# Patient Record
Sex: MALE | Race: WHITE | NOT HISPANIC OR LATINO | URBAN - NONMETROPOLITAN AREA
[De-identification: names, ages, dates, MRNs, and addresses within clinical notes are randomized per-mention and may not be internally consistent; named-entity substitution may affect disease eponyms.]

---

## 2023-07-07 ENCOUNTER — OFFICE VISIT (OUTPATIENT)
Dept: PRIMARY CARE | Facility: CLINIC | Age: 48
End: 2023-07-07
Payer: COMMERCIAL

## 2023-07-07 VITALS
HEIGHT: 74 IN | SYSTOLIC BLOOD PRESSURE: 130 MMHG | HEART RATE: 56 BPM | DIASTOLIC BLOOD PRESSURE: 88 MMHG | OXYGEN SATURATION: 96 % | WEIGHT: 222.25 LBS | BODY MASS INDEX: 28.52 KG/M2

## 2023-07-07 DIAGNOSIS — Z00.00 ROUTINE GENERAL MEDICAL EXAMINATION AT A HEALTH CARE FACILITY: Primary | ICD-10-CM

## 2023-07-07 DIAGNOSIS — Z02.4 ENCOUNTER FOR DEPARTMENT OF TRANSPORTATION (DOT) EXAMINATION FOR DRIVING LICENSE RENEWAL: ICD-10-CM

## 2023-07-07 PROBLEM — I10 BENIGN ESSENTIAL HYPERTENSION: Status: ACTIVE | Noted: 2023-07-07

## 2023-07-07 LAB
POC APPEARANCE, URINE: CLEAR
POC BILIRUBIN, URINE: NEGATIVE
POC BLOOD, URINE: NEGATIVE
POC COLOR, URINE: YELLOW
POC GLUCOSE, URINE: NEGATIVE MG/DL
POC KETONES, URINE: NEGATIVE MG/DL
POC LEUKOCYTES, URINE: NEGATIVE
POC NITRITE,URINE: NEGATIVE
POC PH, URINE: 7 PH
POC PROTEIN, URINE: NEGATIVE MG/DL
POC SPECIFIC GRAVITY, URINE: 1.02
POC UROBILINOGEN, URINE: 0.2 EU/DL

## 2023-07-07 PROCEDURE — 99396 PREV VISIT EST AGE 40-64: CPT | Performed by: FAMILY MEDICINE

## 2023-07-07 PROCEDURE — 81002 URINALYSIS NONAUTO W/O SCOPE: CPT | Performed by: FAMILY MEDICINE

## 2023-07-07 PROCEDURE — 3079F DIAST BP 80-89 MM HG: CPT | Performed by: FAMILY MEDICINE

## 2023-07-07 PROCEDURE — 3075F SYST BP GE 130 - 139MM HG: CPT | Performed by: FAMILY MEDICINE

## 2023-07-07 RX ORDER — LOSARTAN POTASSIUM 100 MG/1
100 TABLET ORAL DAILY
COMMUNITY
End: 2023-09-25

## 2023-07-07 NOTE — PROGRESS NOTES
"Subjective   Patient ID: Lester Rodriges is a 48 y.o. male who presents for Annual Exam (ODOT Physical ).  Patient here for checkup and DOT physical  - Patient has a history of hypertension on recheck his blood pressure was better controlled  - Patient has been taking his medicine as prescribed  - Patient denies any chest pain, shortness of breath, palpitations, syncope or near syncope.  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   HENT:  Negative for congestion and trouble swallowing.    Eyes:  Negative for visual disturbance.   Respiratory:  Negative for shortness of breath.    Cardiovascular:  Negative for chest pain, palpitations and leg swelling.   Gastrointestinal:  Negative for blood in stool, constipation and diarrhea.   Genitourinary:  Negative for difficulty urinating and hematuria.   Musculoskeletal:  Negative for gait problem and joint swelling.   Skin:  Negative for color change.   Allergic/Immunologic: Negative for immunocompromised state.   Neurological:  Negative for seizures and syncope.   Psychiatric/Behavioral:  Negative for confusion and suicidal ideas. The patient is not nervous/anxious.        Objective   BP (!) 145/91   Pulse 56   Ht 1.867 m (6' 1.5\")   Wt 101 kg (222 lb 4 oz)   SpO2 96%   BMI 28.92 kg/m²     Physical Exam  Constitutional:       General: He is not in acute distress.     Appearance: Normal appearance. He is not ill-appearing.   HENT:      Head: Normocephalic and atraumatic.      Right Ear: Tympanic membrane normal.      Left Ear: Tympanic membrane normal.      Nose: Nose normal.      Mouth/Throat:      Mouth: Mucous membranes are moist.   Eyes:      Pupils: Pupils are equal, round, and reactive to light.   Cardiovascular:      Rate and Rhythm: Normal rate and regular rhythm.      Heart sounds: No murmur heard.     No friction rub. No gallop. "   Pulmonary:      Effort: Pulmonary effort is normal.      Breath sounds: Normal breath sounds.   Abdominal:      General: Abdomen is flat. There is no distension.      Palpations: Abdomen is soft.      Tenderness: There is no abdominal tenderness. There is no guarding.      Hernia: No hernia is present.   Musculoskeletal:         General: Normal range of motion.   Skin:     General: Skin is warm and dry.   Neurological:      General: No focal deficit present.      Mental Status: He is alert and oriented to person, place, and time. Mental status is at baseline.      Cranial Nerves: No cranial nerve deficit.      Sensory: No sensory deficit.      Motor: No weakness.      Coordination: Coordination normal.      Gait: Gait normal.   Psychiatric:         Mood and Affect: Mood normal.         Behavior: Behavior normal.         Thought Content: Thought content normal.         Judgment: Judgment normal.         Assessment/Plan   Problem List Items Addressed This Visit    None  Visit Diagnoses       Encounter for Department of Transportation (DOT) examination for driving license renewal        Relevant Orders    POCT UA (nonautomated) manually resulted (Completed)          A/P:  #DOT PE: cleared for 1y cert ending 7/7/24    #HTN:  -losartan 100mg        HM:  -cologuard: 7/25  -suggest blood work

## 2023-07-10 ASSESSMENT — ENCOUNTER SYMPTOMS
FEVER: 0
CONSTIPATION: 0
HEMATURIA: 0
TROUBLE SWALLOWING: 0
CONFUSION: 0
NERVOUS/ANXIOUS: 0
COLOR CHANGE: 0
SEIZURES: 0
APPETITE CHANGE: 0
DIFFICULTY URINATING: 0
PALPITATIONS: 0
SHORTNESS OF BREATH: 0
UNEXPECTED WEIGHT CHANGE: 0
DIARRHEA: 0
JOINT SWELLING: 0
BLOOD IN STOOL: 0

## 2023-07-10 ASSESSMENT — PATIENT HEALTH QUESTIONNAIRE - PHQ9
2. FEELING DOWN, DEPRESSED OR HOPELESS: NOT AT ALL
1. LITTLE INTEREST OR PLEASURE IN DOING THINGS: NOT AT ALL
SUM OF ALL RESPONSES TO PHQ9 QUESTIONS 1 AND 2: 0

## 2023-09-25 DIAGNOSIS — I10 ESSENTIAL (PRIMARY) HYPERTENSION: ICD-10-CM

## 2023-09-25 RX ORDER — LOSARTAN POTASSIUM 100 MG/1
100 TABLET ORAL DAILY
Qty: 90 TABLET | Refills: 3 | Status: SHIPPED | OUTPATIENT
Start: 2023-09-25

## 2024-07-18 ENCOUNTER — TELEPHONE (OUTPATIENT)
Dept: PRIMARY CARE | Facility: CLINIC | Age: 49
End: 2024-07-18
Payer: COMMERCIAL

## 2024-07-18 DIAGNOSIS — I10 ESSENTIAL (PRIMARY) HYPERTENSION: ICD-10-CM

## 2024-07-18 RX ORDER — LOSARTAN POTASSIUM 100 MG/1
100 TABLET ORAL DAILY
Qty: 90 TABLET | Refills: 0 | Status: SHIPPED | OUTPATIENT
Start: 2024-07-18

## 2024-10-24 ENCOUNTER — APPOINTMENT (OUTPATIENT)
Dept: PRIMARY CARE | Facility: CLINIC | Age: 49
End: 2024-10-24
Payer: COMMERCIAL

## 2024-10-24 VITALS
DIASTOLIC BLOOD PRESSURE: 92 MMHG | OXYGEN SATURATION: 97 % | WEIGHT: 230 LBS | HEIGHT: 73 IN | HEART RATE: 82 BPM | BODY MASS INDEX: 30.48 KG/M2 | SYSTOLIC BLOOD PRESSURE: 142 MMHG

## 2024-10-24 DIAGNOSIS — Z13.1 DIABETES MELLITUS SCREENING: ICD-10-CM

## 2024-10-24 DIAGNOSIS — Z13.220 LIPID SCREENING: ICD-10-CM

## 2024-10-24 DIAGNOSIS — Z00.00 ROUTINE GENERAL MEDICAL EXAMINATION AT A HEALTH CARE FACILITY: Primary | ICD-10-CM

## 2024-10-24 DIAGNOSIS — I10 ESSENTIAL (PRIMARY) HYPERTENSION: ICD-10-CM

## 2024-10-24 DIAGNOSIS — Z12.5 PROSTATE CANCER SCREENING: ICD-10-CM

## 2024-10-24 DIAGNOSIS — Z13.0 SCREENING FOR DEFICIENCY ANEMIA: ICD-10-CM

## 2024-10-24 LAB
ALBUMIN SERPL BCP-MCNC: 4.2 G/DL (ref 3.4–5)
ALP SERPL-CCNC: 102 U/L (ref 33–120)
ALT SERPL W P-5'-P-CCNC: 24 U/L (ref 10–52)
ANION GAP SERPL CALC-SCNC: 12 MMOL/L (ref 10–20)
AST SERPL W P-5'-P-CCNC: 15 U/L (ref 9–39)
BILIRUB SERPL-MCNC: 0.3 MG/DL (ref 0–1.2)
BUN SERPL-MCNC: 17 MG/DL (ref 6–23)
CALCIUM SERPL-MCNC: 9 MG/DL (ref 8.6–10.3)
CHLORIDE SERPL-SCNC: 103 MMOL/L (ref 98–107)
CHOLEST SERPL-MCNC: 174 MG/DL (ref 0–199)
CHOLESTEROL/HDL RATIO: 3.5
CO2 SERPL-SCNC: 27 MMOL/L (ref 21–32)
CREAT SERPL-MCNC: 0.91 MG/DL (ref 0.5–1.3)
EGFRCR SERPLBLD CKD-EPI 2021: >90 ML/MIN/1.73M*2
ERYTHROCYTE [DISTWIDTH] IN BLOOD BY AUTOMATED COUNT: 13 % (ref 11.5–14.5)
GLUCOSE SERPL-MCNC: 91 MG/DL (ref 74–99)
HCT VFR BLD AUTO: 45 % (ref 41–52)
HDLC SERPL-MCNC: 50 MG/DL
HGB BLD-MCNC: 15 G/DL (ref 13.5–17.5)
LDLC SERPL CALC-MCNC: 84 MG/DL
MCH RBC QN AUTO: 29.4 PG (ref 26–34)
MCHC RBC AUTO-ENTMCNC: 33.3 G/DL (ref 32–36)
MCV RBC AUTO: 88 FL (ref 80–100)
NON HDL CHOLESTEROL: 124 MG/DL (ref 0–149)
NRBC BLD-RTO: 0 /100 WBCS (ref 0–0)
PLATELET # BLD AUTO: 271 X10*3/UL (ref 150–450)
POTASSIUM SERPL-SCNC: 4.3 MMOL/L (ref 3.5–5.3)
PROT SERPL-MCNC: 6.4 G/DL (ref 6.4–8.2)
PSA SERPL-MCNC: 0.63 NG/ML
RBC # BLD AUTO: 5.1 X10*6/UL (ref 4.5–5.9)
SODIUM SERPL-SCNC: 138 MMOL/L (ref 136–145)
TRIGL SERPL-MCNC: 198 MG/DL (ref 0–149)
VLDL: 40 MG/DL (ref 0–40)
WBC # BLD AUTO: 5.7 X10*3/UL (ref 4.4–11.3)

## 2024-10-24 PROCEDURE — 85027 COMPLETE CBC AUTOMATED: CPT

## 2024-10-24 PROCEDURE — 80053 COMPREHEN METABOLIC PANEL: CPT

## 2024-10-24 PROCEDURE — 80061 LIPID PANEL: CPT

## 2024-10-24 PROCEDURE — 3080F DIAST BP >= 90 MM HG: CPT | Performed by: FAMILY MEDICINE

## 2024-10-24 PROCEDURE — 84153 ASSAY OF PSA TOTAL: CPT

## 2024-10-24 PROCEDURE — 99396 PREV VISIT EST AGE 40-64: CPT | Performed by: FAMILY MEDICINE

## 2024-10-24 PROCEDURE — 3077F SYST BP >= 140 MM HG: CPT | Performed by: FAMILY MEDICINE

## 2024-10-24 PROCEDURE — 3008F BODY MASS INDEX DOCD: CPT | Performed by: FAMILY MEDICINE

## 2024-10-24 RX ORDER — LOSARTAN POTASSIUM 100 MG/1
100 TABLET ORAL DAILY
Qty: 90 TABLET | Refills: 3 | Status: SHIPPED | OUTPATIENT
Start: 2024-10-24

## 2024-10-24 ASSESSMENT — ENCOUNTER SYMPTOMS
COLOR CHANGE: 0
UNEXPECTED WEIGHT CHANGE: 0
SHORTNESS OF BREATH: 0
BLOOD IN STOOL: 0
CONFUSION: 0
DIFFICULTY URINATING: 0
JOINT SWELLING: 0
SEIZURES: 0
DIARRHEA: 0
TROUBLE SWALLOWING: 0
NERVOUS/ANXIOUS: 0
HEMATURIA: 0
APPETITE CHANGE: 0
FEVER: 0
PALPITATIONS: 0
CONSTIPATION: 0

## 2024-10-24 NOTE — PROGRESS NOTES
"Subjective   Patient ID: Lester Rodriges is a 49 y.o. male who presents for Annual Exam (Yearly check up ).  HPI  BP high today:  -at home running max 140-usually 125-130/80's  -no sob, cp,palps    Otherwise patient has been feeling well.  Sleeping normally.  Normal voiding and stooling.  No chest pain shortness of breath palpitation.  No depression or anxiety.  He quit smoking in 2030.    Review of Systems   Constitutional:  Negative for appetite change, fever and unexpected weight change.   HENT:  Negative for congestion and trouble swallowing.    Eyes:  Negative for visual disturbance.   Respiratory:  Negative for shortness of breath.    Cardiovascular:  Negative for chest pain, palpitations and leg swelling.   Gastrointestinal:  Negative for blood in stool, constipation and diarrhea.   Genitourinary:  Negative for difficulty urinating and hematuria.   Musculoskeletal:  Negative for gait problem and joint swelling.   Skin:  Negative for color change.   Allergic/Immunologic: Negative for immunocompromised state.   Neurological:  Negative for seizures and syncope.   Psychiatric/Behavioral:  Negative for confusion and suicidal ideas. The patient is not nervous/anxious.        Objective   BP (!) 142/92   Pulse 82   Ht 1.854 m (6' 1\")   Wt 104 kg (230 lb)   SpO2 97%   BMI 30.34 kg/m²     Physical Exam  Constitutional:       General: He is not in acute distress.     Appearance: Normal appearance. He is not ill-appearing.   HENT:      Head: Normocephalic and atraumatic.      Right Ear: Tympanic membrane normal.      Left Ear: Tympanic membrane normal.      Nose: Nose normal.      Mouth/Throat:      Mouth: Mucous membranes are moist.   Eyes:      Pupils: Pupils are equal, round, and reactive to light.   Cardiovascular:      Rate and Rhythm: Normal rate and regular rhythm.      Heart sounds: No murmur heard.     No friction rub. No gallop.   Pulmonary:      Effort: Pulmonary effort is normal.      Breath sounds: Normal " breath sounds.   Abdominal:      General: Abdomen is flat. There is no distension.      Palpations: Abdomen is soft.      Tenderness: There is no abdominal tenderness. There is no guarding.      Hernia: No hernia is present.   Musculoskeletal:         General: Normal range of motion.   Skin:     General: Skin is warm and dry.   Neurological:      General: No focal deficit present.      Mental Status: He is alert. Mental status is at baseline.      Cranial Nerves: No cranial nerve deficit.      Motor: No weakness.      Gait: Gait normal.   Psychiatric:         Mood and Affect: Mood normal.         Behavior: Behavior normal.         Thought Content: Thought content normal.         Judgment: Judgment normal.         Assessment/Plan   Problem List Items Addressed This Visit    None  Visit Diagnoses       Screening for deficiency anemia    -  Primary    Relevant Orders    CBC    Essential (primary) hypertension        Relevant Medications    losartan (Cozaar) 100 mg tablet    Diabetes mellitus screening        Relevant Orders    Comprehensive Metabolic Panel    Lipid screening        Relevant Orders    Lipid Panel    Prostate cancer screening        Relevant Orders    Prostate Specific Antigen, Screen          Assessment/plan:    DOT PE: cleared for 1y cert ending 7/7/24     Hypertension:  -losartan 100mg   -BP at [facility] all normal- so we will follow     Health Maintenance Reminder:  -Blood Work: today  -PSA: today  -Hep C:   -Colonoscopy: cologrivera 7/25  -Lung Cancer: quit 2003   -AAA: 65y   -Shingrix: refused  -Prevnar 20: 65y  -Flu:  refused

## 2025-01-29 ENCOUNTER — APPOINTMENT (OUTPATIENT)
Dept: RADIOLOGY | Facility: HOSPITAL | Age: 50
End: 2025-01-29
Payer: COMMERCIAL

## 2025-01-29 ENCOUNTER — HOSPITAL ENCOUNTER (OUTPATIENT)
Facility: HOSPITAL | Age: 50
Setting detail: OBSERVATION
Discharge: HOME | End: 2025-01-29
Attending: STUDENT IN AN ORGANIZED HEALTH CARE EDUCATION/TRAINING PROGRAM | Admitting: STUDENT IN AN ORGANIZED HEALTH CARE EDUCATION/TRAINING PROGRAM
Payer: COMMERCIAL

## 2025-01-29 ENCOUNTER — APPOINTMENT (OUTPATIENT)
Dept: VASCULAR MEDICINE | Facility: HOSPITAL | Age: 50
End: 2025-01-29
Payer: COMMERCIAL

## 2025-01-29 ENCOUNTER — APPOINTMENT (OUTPATIENT)
Dept: CARDIOLOGY | Facility: HOSPITAL | Age: 50
End: 2025-01-29
Payer: COMMERCIAL

## 2025-01-29 VITALS
TEMPERATURE: 99 F | DIASTOLIC BLOOD PRESSURE: 102 MMHG | BODY MASS INDEX: 30.95 KG/M2 | SYSTOLIC BLOOD PRESSURE: 148 MMHG | HEART RATE: 72 BPM | HEIGHT: 73 IN | OXYGEN SATURATION: 96 % | WEIGHT: 233.5 LBS | RESPIRATION RATE: 16 BRPM

## 2025-01-29 DIAGNOSIS — R55 SYNCOPE, UNSPECIFIED SYNCOPE TYPE: ICD-10-CM

## 2025-01-29 DIAGNOSIS — I10 ESSENTIAL (PRIMARY) HYPERTENSION: ICD-10-CM

## 2025-01-29 DIAGNOSIS — R79.89 ELEVATED TROPONIN: Primary | ICD-10-CM

## 2025-01-29 DIAGNOSIS — R55 VASOVAGAL SYNCOPE: Primary | ICD-10-CM

## 2025-01-29 DIAGNOSIS — M79.604 RIGHT LEG PAIN: ICD-10-CM

## 2025-01-29 DIAGNOSIS — I95.1 ORTHOSTATIC SYNCOPE: ICD-10-CM

## 2025-01-29 LAB
ALBUMIN SERPL BCP-MCNC: 3.9 G/DL (ref 3.4–5)
ALP SERPL-CCNC: 93 U/L (ref 33–120)
ALT SERPL W P-5'-P-CCNC: 31 U/L (ref 10–52)
ANION GAP SERPL CALC-SCNC: 11 MMOL/L (ref 10–20)
ANION GAP SERPL CALC-SCNC: 8 MMOL/L (ref 10–20)
AORTIC VALVE MEAN GRADIENT: 6 MMHG
AORTIC VALVE PEAK VELOCITY: 1.62 M/S
AST SERPL W P-5'-P-CCNC: 18 U/L (ref 9–39)
ATRIAL RATE: 59 BPM
ATRIAL RATE: 65 BPM
AV PEAK GRADIENT: 11 MMHG
AVA (PEAK VEL): 1.98 CM2
AVA (VTI): 1.89 CM2
BASOPHILS # BLD AUTO: 0.05 X10*3/UL (ref 0–0.1)
BASOPHILS NFR BLD AUTO: 0.9 %
BILIRUB SERPL-MCNC: 0.4 MG/DL (ref 0–1.2)
BNP SERPL-MCNC: 9 PG/ML (ref 0–99)
BUN SERPL-MCNC: 15 MG/DL (ref 6–23)
BUN SERPL-MCNC: 16 MG/DL (ref 6–23)
CALCIUM SERPL-MCNC: 8.5 MG/DL (ref 8.6–10.3)
CALCIUM SERPL-MCNC: 8.9 MG/DL (ref 8.6–10.3)
CARDIAC TROPONIN I PNL SERPL HS: 113 NG/L (ref 0–20)
CARDIAC TROPONIN I PNL SERPL HS: 42 NG/L (ref 0–20)
CARDIAC TROPONIN I PNL SERPL HS: 48 NG/L (ref 0–20)
CARDIAC TROPONIN I PNL SERPL HS: 95 NG/L (ref 0–20)
CHLORIDE SERPL-SCNC: 101 MMOL/L (ref 98–107)
CHLORIDE SERPL-SCNC: 106 MMOL/L (ref 98–107)
CO2 SERPL-SCNC: 29 MMOL/L (ref 21–32)
CO2 SERPL-SCNC: 31 MMOL/L (ref 21–32)
CREAT SERPL-MCNC: 0.81 MG/DL (ref 0.5–1.3)
CREAT SERPL-MCNC: 0.88 MG/DL (ref 0.5–1.3)
D DIMER PPP FEU-MCNC: <215 NG/ML FEU
EGFRCR SERPLBLD CKD-EPI 2021: >90 ML/MIN/1.73M*2
EGFRCR SERPLBLD CKD-EPI 2021: >90 ML/MIN/1.73M*2
EJECTION FRACTION APICAL 4 CHAMBER: 47.8
EJECTION FRACTION: 58 %
EOSINOPHIL # BLD AUTO: 0.13 X10*3/UL (ref 0–0.7)
EOSINOPHIL NFR BLD AUTO: 2.2 %
ERYTHROCYTE [DISTWIDTH] IN BLOOD BY AUTOMATED COUNT: 13.5 % (ref 11.5–14.5)
ERYTHROCYTE [DISTWIDTH] IN BLOOD BY AUTOMATED COUNT: 13.6 % (ref 11.5–14.5)
GLUCOSE SERPL-MCNC: 115 MG/DL (ref 74–99)
GLUCOSE SERPL-MCNC: 136 MG/DL (ref 74–99)
HCT VFR BLD AUTO: 40.9 % (ref 41–52)
HCT VFR BLD AUTO: 42.3 % (ref 41–52)
HGB BLD-MCNC: 13.8 G/DL (ref 13.5–17.5)
HGB BLD-MCNC: 14.4 G/DL (ref 13.5–17.5)
IMM GRANULOCYTES # BLD AUTO: 0.01 X10*3/UL (ref 0–0.7)
IMM GRANULOCYTES NFR BLD AUTO: 0.2 % (ref 0–0.9)
LEFT ATRIUM VOLUME AREA LENGTH INDEX BSA: 20.1 ML/M2
LEFT VENTRICLE INTERNAL DIMENSION DIASTOLE: 4.85 CM (ref 3.5–6)
LEFT VENTRICULAR OUTFLOW TRACT DIAMETER: 2 CM
LYMPHOCYTES # BLD AUTO: 1.83 X10*3/UL (ref 1.2–4.8)
LYMPHOCYTES NFR BLD AUTO: 31.3 %
MAGNESIUM SERPL-MCNC: 1.74 MG/DL (ref 1.6–2.4)
MCH RBC QN AUTO: 29.2 PG (ref 26–34)
MCH RBC QN AUTO: 29.4 PG (ref 26–34)
MCHC RBC AUTO-ENTMCNC: 33.7 G/DL (ref 32–36)
MCHC RBC AUTO-ENTMCNC: 34 G/DL (ref 32–36)
MCV RBC AUTO: 87 FL (ref 80–100)
MCV RBC AUTO: 87 FL (ref 80–100)
MITRAL VALVE E/A RATIO: 1.61
MONOCYTES # BLD AUTO: 0.45 X10*3/UL (ref 0.1–1)
MONOCYTES NFR BLD AUTO: 7.7 %
NEUTROPHILS # BLD AUTO: 3.37 X10*3/UL (ref 1.2–7.7)
NEUTROPHILS NFR BLD AUTO: 57.7 %
NRBC BLD-RTO: 0 /100 WBCS (ref 0–0)
NRBC BLD-RTO: 0 /100 WBCS (ref 0–0)
P AXIS: -9 DEGREES
P AXIS: 92 DEGREES
P OFFSET: 178 MS
P OFFSET: 191 MS
P ONSET: 148 MS
P ONSET: 153 MS
PHOSPHATE SERPL-MCNC: 1.6 MG/DL (ref 2.5–4.9)
PLATELET # BLD AUTO: 210 X10*3/UL (ref 150–450)
PLATELET # BLD AUTO: 231 X10*3/UL (ref 150–450)
POTASSIUM SERPL-SCNC: 3.5 MMOL/L (ref 3.5–5.3)
POTASSIUM SERPL-SCNC: 4.2 MMOL/L (ref 3.5–5.3)
PR INTERVAL: 112 MS
PR INTERVAL: 120 MS
PROT SERPL-MCNC: 6.3 G/DL (ref 6.4–8.2)
Q ONSET: 208 MS
Q ONSET: 209 MS
QRS COUNT: 10 BEATS
QRS COUNT: 11 BEATS
QRS DURATION: 92 MS
QRS DURATION: 94 MS
QT INTERVAL: 408 MS
QT INTERVAL: 414 MS
QTC CALCULATION(BAZETT): 409 MS
QTC CALCULATION(BAZETT): 424 MS
QTC FREDERICIA: 411 MS
QTC FREDERICIA: 418 MS
R AXIS: -43 DEGREES
R AXIS: -44 DEGREES
RBC # BLD AUTO: 4.72 X10*6/UL (ref 4.5–5.9)
RBC # BLD AUTO: 4.89 X10*6/UL (ref 4.5–5.9)
RIGHT VENTRICLE FREE WALL PEAK S': 14 CM/S
RIGHT VENTRICLE PEAK SYSTOLIC PRESSURE: 4.7 MMHG
SODIUM SERPL-SCNC: 139 MMOL/L (ref 136–145)
SODIUM SERPL-SCNC: 139 MMOL/L (ref 136–145)
T AXIS: 26 DEGREES
T AXIS: 33 DEGREES
T OFFSET: 413 MS
T OFFSET: 415 MS
TSH SERPL-ACNC: 2.42 MIU/L (ref 0.44–3.98)
VENTRICULAR RATE: 59 BPM
VENTRICULAR RATE: 65 BPM
WBC # BLD AUTO: 5.8 X10*3/UL (ref 4.4–11.3)
WBC # BLD AUTO: 6.3 X10*3/UL (ref 4.4–11.3)

## 2025-01-29 PROCEDURE — 3430000001 HC RX 343 DIAGNOSTIC RADIOPHARMACEUTICALS: Performed by: NURSE PRACTITIONER

## 2025-01-29 PROCEDURE — 93005 ELECTROCARDIOGRAM TRACING: CPT

## 2025-01-29 PROCEDURE — 80048 BASIC METABOLIC PNL TOTAL CA: CPT | Mod: CCI

## 2025-01-29 PROCEDURE — 85379 FIBRIN DEGRADATION QUANT: CPT | Performed by: STUDENT IN AN ORGANIZED HEALTH CARE EDUCATION/TRAINING PROGRAM

## 2025-01-29 PROCEDURE — 85027 COMPLETE CBC AUTOMATED: CPT

## 2025-01-29 PROCEDURE — A9502 TC99M TETROFOSMIN: HCPCS | Performed by: NURSE PRACTITIONER

## 2025-01-29 PROCEDURE — 84484 ASSAY OF TROPONIN QUANT: CPT

## 2025-01-29 PROCEDURE — 96361 HYDRATE IV INFUSION ADD-ON: CPT | Mod: 59

## 2025-01-29 PROCEDURE — 2500000001 HC RX 250 WO HCPCS SELF ADMINISTERED DRUGS (ALT 637 FOR MEDICARE OP)

## 2025-01-29 PROCEDURE — A9502 TC99M TETROFOSMIN: HCPCS | Performed by: STUDENT IN AN ORGANIZED HEALTH CARE EDUCATION/TRAINING PROGRAM

## 2025-01-29 PROCEDURE — 83735 ASSAY OF MAGNESIUM: CPT

## 2025-01-29 PROCEDURE — 36415 COLL VENOUS BLD VENIPUNCTURE: CPT | Performed by: STUDENT IN AN ORGANIZED HEALTH CARE EDUCATION/TRAINING PROGRAM

## 2025-01-29 PROCEDURE — 93306 TTE W/DOPPLER COMPLETE: CPT

## 2025-01-29 PROCEDURE — 93018 CV STRESS TEST I&R ONLY: CPT | Performed by: STUDENT IN AN ORGANIZED HEALTH CARE EDUCATION/TRAINING PROGRAM

## 2025-01-29 PROCEDURE — 84484 ASSAY OF TROPONIN QUANT: CPT | Performed by: STUDENT IN AN ORGANIZED HEALTH CARE EDUCATION/TRAINING PROGRAM

## 2025-01-29 PROCEDURE — 99285 EMERGENCY DEPT VISIT HI MDM: CPT | Mod: 25 | Performed by: STUDENT IN AN ORGANIZED HEALTH CARE EDUCATION/TRAINING PROGRAM

## 2025-01-29 PROCEDURE — 84100 ASSAY OF PHOSPHORUS: CPT

## 2025-01-29 PROCEDURE — 3430000001 HC RX 343 DIAGNOSTIC RADIOPHARMACEUTICALS: Performed by: STUDENT IN AN ORGANIZED HEALTH CARE EDUCATION/TRAINING PROGRAM

## 2025-01-29 PROCEDURE — 93971 EXTREMITY STUDY: CPT | Performed by: INTERNAL MEDICINE

## 2025-01-29 PROCEDURE — 83880 ASSAY OF NATRIURETIC PEPTIDE: CPT | Performed by: STUDENT IN AN ORGANIZED HEALTH CARE EDUCATION/TRAINING PROGRAM

## 2025-01-29 PROCEDURE — 99222 1ST HOSP IP/OBS MODERATE 55: CPT | Performed by: STUDENT IN AN ORGANIZED HEALTH CARE EDUCATION/TRAINING PROGRAM

## 2025-01-29 PROCEDURE — 93017 CV STRESS TEST TRACING ONLY: CPT

## 2025-01-29 PROCEDURE — 80053 COMPREHEN METABOLIC PANEL: CPT | Performed by: STUDENT IN AN ORGANIZED HEALTH CARE EDUCATION/TRAINING PROGRAM

## 2025-01-29 PROCEDURE — 70450 CT HEAD/BRAIN W/O DYE: CPT | Performed by: RADIOLOGY

## 2025-01-29 PROCEDURE — G0378 HOSPITAL OBSERVATION PER HR: HCPCS

## 2025-01-29 PROCEDURE — 78452 HT MUSCLE IMAGE SPECT MULT: CPT

## 2025-01-29 PROCEDURE — 70450 CT HEAD/BRAIN W/O DYE: CPT

## 2025-01-29 PROCEDURE — 93016 CV STRESS TEST SUPVJ ONLY: CPT | Performed by: STUDENT IN AN ORGANIZED HEALTH CARE EDUCATION/TRAINING PROGRAM

## 2025-01-29 PROCEDURE — 2500000004 HC RX 250 GENERAL PHARMACY W/ HCPCS (ALT 636 FOR OP/ED): Performed by: STUDENT IN AN ORGANIZED HEALTH CARE EDUCATION/TRAINING PROGRAM

## 2025-01-29 PROCEDURE — 96360 HYDRATION IV INFUSION INIT: CPT | Mod: 59

## 2025-01-29 PROCEDURE — 2500000004 HC RX 250 GENERAL PHARMACY W/ HCPCS (ALT 636 FOR OP/ED)

## 2025-01-29 PROCEDURE — 93306 TTE W/DOPPLER COMPLETE: CPT | Performed by: STUDENT IN AN ORGANIZED HEALTH CARE EDUCATION/TRAINING PROGRAM

## 2025-01-29 PROCEDURE — 78452 HT MUSCLE IMAGE SPECT MULT: CPT | Performed by: RADIOLOGY

## 2025-01-29 PROCEDURE — 93971 EXTREMITY STUDY: CPT

## 2025-01-29 PROCEDURE — 84443 ASSAY THYROID STIM HORMONE: CPT | Performed by: NURSE PRACTITIONER

## 2025-01-29 PROCEDURE — 93005 ELECTROCARDIOGRAM TRACING: CPT | Mod: 59

## 2025-01-29 PROCEDURE — 85025 COMPLETE CBC W/AUTO DIFF WBC: CPT | Performed by: STUDENT IN AN ORGANIZED HEALTH CARE EDUCATION/TRAINING PROGRAM

## 2025-01-29 PROCEDURE — 99235 HOSP IP/OBS SAME DATE MOD 70: CPT | Performed by: STUDENT IN AN ORGANIZED HEALTH CARE EDUCATION/TRAINING PROGRAM

## 2025-01-29 RX ORDER — LANOLIN ALCOHOL/MO/W.PET/CERES
400 CREAM (GRAM) TOPICAL ONCE
Status: COMPLETED | OUTPATIENT
Start: 2025-01-29 | End: 2025-01-29

## 2025-01-29 RX ORDER — DEXTROSE 50 % IN WATER (D50W) INTRAVENOUS SYRINGE
25
Status: DISCONTINUED | OUTPATIENT
Start: 2025-01-29 | End: 2025-01-29 | Stop reason: HOSPADM

## 2025-01-29 RX ORDER — DEXTROSE 50 % IN WATER (D50W) INTRAVENOUS SYRINGE
12.5
Status: DISCONTINUED | OUTPATIENT
Start: 2025-01-29 | End: 2025-01-29 | Stop reason: HOSPADM

## 2025-01-29 RX ORDER — LOSARTAN POTASSIUM 100 MG/1
50 TABLET ORAL DAILY
Qty: 15 TABLET | Refills: 0 | Status: SHIPPED | OUTPATIENT
Start: 2025-01-29 | End: 2025-02-28

## 2025-01-29 RX ORDER — LOSARTAN POTASSIUM 50 MG/1
100 TABLET ORAL DAILY
Status: DISCONTINUED | OUTPATIENT
Start: 2025-01-29 | End: 2025-01-29

## 2025-01-29 RX ORDER — INSULIN LISPRO 100 [IU]/ML
0-5 INJECTION, SOLUTION INTRAVENOUS; SUBCUTANEOUS
Status: DISCONTINUED | OUTPATIENT
Start: 2025-01-29 | End: 2025-01-29

## 2025-01-29 RX ORDER — LOSARTAN POTASSIUM 50 MG/1
50 TABLET ORAL DAILY
Status: DISCONTINUED | OUTPATIENT
Start: 2025-01-30 | End: 2025-01-29 | Stop reason: HOSPADM

## 2025-01-29 RX ADMIN — POTASSIUM PHOSPHATE, MONOBASIC 500 MG: 500 TABLET, SOLUBLE ORAL at 14:36

## 2025-01-29 RX ADMIN — SODIUM CHLORIDE 1000 ML: 9 INJECTION, SOLUTION INTRAVENOUS at 02:22

## 2025-01-29 RX ADMIN — TETROFOSMIN 10.5 MILLICURIE: 0.23 INJECTION, POWDER, LYOPHILIZED, FOR SOLUTION INTRAVENOUS at 11:25

## 2025-01-29 RX ADMIN — TETROFOSMIN 31.1 MILLICURIE: 0.23 INJECTION, POWDER, LYOPHILIZED, FOR SOLUTION INTRAVENOUS at 12:57

## 2025-01-29 RX ADMIN — Medication 400 MG: at 08:21

## 2025-01-29 RX ADMIN — POTASSIUM PHOSPHATE, MONOBASIC 500 MG: 500 TABLET, SOLUBLE ORAL at 08:21

## 2025-01-29 SDOH — SOCIAL STABILITY: SOCIAL INSECURITY: DO YOU FEEL ANYONE HAS EXPLOITED OR TAKEN ADVANTAGE OF YOU FINANCIALLY OR OF YOUR PERSONAL PROPERTY?: NO

## 2025-01-29 SDOH — ECONOMIC STABILITY: FOOD INSECURITY: WITHIN THE PAST 12 MONTHS, THE FOOD YOU BOUGHT JUST DIDN'T LAST AND YOU DIDN'T HAVE MONEY TO GET MORE.: NEVER TRUE

## 2025-01-29 SDOH — SOCIAL STABILITY: SOCIAL INSECURITY: ARE YOU OR HAVE YOU BEEN THREATENED OR ABUSED PHYSICALLY, EMOTIONALLY, OR SEXUALLY BY ANYONE?: NO

## 2025-01-29 SDOH — ECONOMIC STABILITY: HOUSING INSECURITY
IN THE LAST 12 MONTHS, WAS THERE A TIME WHEN YOU DID NOT HAVE A STEADY PLACE TO SLEEP OR SLEPT IN A SHELTER (INCLUDING NOW)?: NO

## 2025-01-29 SDOH — ECONOMIC STABILITY: HOUSING INSECURITY: IN THE LAST 12 MONTHS, WAS THERE A TIME WHEN YOU WERE NOT ABLE TO PAY THE MORTGAGE OR RENT ON TIME?: NO

## 2025-01-29 SDOH — ECONOMIC STABILITY: FOOD INSECURITY: WITHIN THE PAST 12 MONTHS, YOU WORRIED THAT YOUR FOOD WOULD RUN OUT BEFORE YOU GOT THE MONEY TO BUY MORE.: NEVER TRUE

## 2025-01-29 SDOH — SOCIAL STABILITY: SOCIAL INSECURITY: WITHIN THE LAST YEAR, HAVE YOU BEEN AFRAID OF YOUR PARTNER OR EX-PARTNER?: NO

## 2025-01-29 SDOH — SOCIAL STABILITY: SOCIAL INSECURITY
WITHIN THE LAST YEAR, HAVE YOU BEEN KICKED, HIT, SLAPPED, OR OTHERWISE PHYSICALLY HURT BY YOUR PARTNER OR EX-PARTNER?: NO

## 2025-01-29 SDOH — ECONOMIC STABILITY: TRANSPORTATION INSECURITY
IN THE PAST 12 MONTHS, HAS LACK OF TRANSPORTATION KEPT YOU FROM MEETINGS, WORK, OR FROM GETTING THINGS NEEDED FOR DAILY LIVING?: NO

## 2025-01-29 SDOH — ECONOMIC STABILITY: FOOD INSECURITY

## 2025-01-29 SDOH — ECONOMIC STABILITY: TRANSPORTATION INSECURITY

## 2025-01-29 SDOH — ECONOMIC STABILITY: HOUSING INSECURITY: IN THE PAST 12 MONTHS HAS THE ELECTRIC, GAS, OIL, OR WATER COMPANY THREATENED TO SHUT OFF SERVICES IN YOUR HOME?: NO

## 2025-01-29 SDOH — SOCIAL STABILITY: SOCIAL INSECURITY: WITHIN THE LAST YEAR, HAVE YOU BEEN HUMILIATED OR EMOTIONALLY ABUSED IN OTHER WAYS BY YOUR PARTNER OR EX-PARTNER?: NO

## 2025-01-29 SDOH — SOCIAL STABILITY: SOCIAL INSECURITY: ABUSE: ADULT

## 2025-01-29 SDOH — ECONOMIC STABILITY: TRANSPORTATION INSECURITY: IN THE PAST 12 MONTHS, HAS LACK OF TRANSPORTATION KEPT YOU FROM MEDICAL APPOINTMENTS OR FROM GETTING MEDICATIONS?: NO

## 2025-01-29 SDOH — ECONOMIC STABILITY: FOOD INSECURITY: WITHIN THE PAST 12 MONTHS, YOU WORRIED THAT YOUR FOOD WOULD RUN OUT BEFORE YOU GOT MONEY TO BUY MORE.: NEVER TRUE

## 2025-01-29 SDOH — SOCIAL STABILITY: SOCIAL INSECURITY: HAVE YOU HAD THOUGHTS OF HARMING ANYONE ELSE?: NO

## 2025-01-29 SDOH — SOCIAL STABILITY: SOCIAL INSECURITY: WERE YOU ABLE TO COMPLETE ALL THE BEHAVIORAL HEALTH SCREENINGS?: YES

## 2025-01-29 SDOH — ECONOMIC STABILITY: HOUSING INSECURITY

## 2025-01-29 SDOH — ECONOMIC STABILITY: HOUSING INSECURITY: IN THE LAST 12 MONTHS, HOW MANY PLACES HAVE YOU LIVED?: 1

## 2025-01-29 SDOH — ECONOMIC STABILITY: INCOME INSECURITY: IN THE PAST 12 MONTHS HAS THE ELECTRIC, GAS, OIL, OR WATER COMPANY THREATENED TO SHUT OFF SERVICES IN YOUR HOME?: NO

## 2025-01-29 SDOH — SOCIAL STABILITY: SOCIAL INSECURITY: HAVE YOU HAD ANY THOUGHTS OF HARMING ANYONE ELSE?: NO

## 2025-01-29 SDOH — ECONOMIC STABILITY: INCOME INSECURITY: IN THE LAST 12 MONTHS, WAS THERE A TIME WHEN YOU WERE NOT ABLE TO PAY THE MORTGAGE OR RENT ON TIME?: NO

## 2025-01-29 SDOH — ECONOMIC STABILITY: GENERAL

## 2025-01-29 SDOH — SOCIAL STABILITY: SOCIAL INSECURITY: HAS ANYONE EVER THREATENED TO HURT YOUR FAMILY OR YOUR PETS?: NO

## 2025-01-29 SDOH — SOCIAL STABILITY: SOCIAL INSECURITY: DO YOU FEEL UNSAFE GOING BACK TO THE PLACE WHERE YOU ARE LIVING?: NO

## 2025-01-29 SDOH — SOCIAL STABILITY: SOCIAL INSECURITY
WITHIN THE LAST YEAR, HAVE YOU BEEN RAPED OR FORCED TO HAVE ANY KIND OF SEXUAL ACTIVITY BY YOUR PARTNER OR EX-PARTNER?: NO

## 2025-01-29 SDOH — ECONOMIC STABILITY: TRANSPORTATION INSECURITY
IN THE PAST 12 MONTHS, HAS THE LACK OF TRANSPORTATION KEPT YOU FROM MEDICAL APPOINTMENTS OR FROM GETTING MEDICATIONS?: NO

## 2025-01-29 SDOH — SOCIAL STABILITY: SOCIAL INSECURITY: ARE THERE ANY APPARENT SIGNS OF INJURIES/BEHAVIORS THAT COULD BE RELATED TO ABUSE/NEGLECT?: NO

## 2025-01-29 SDOH — SOCIAL STABILITY: SOCIAL INSECURITY: DOES ANYONE TRY TO KEEP YOU FROM HAVING/CONTACTING OTHER FRIENDS OR DOING THINGS OUTSIDE YOUR HOME?: NO

## 2025-01-29 ASSESSMENT — COGNITIVE AND FUNCTIONAL STATUS - GENERAL
PATIENT BASELINE BEDBOUND: NO
MOBILITY SCORE: 24
DAILY ACTIVITIY SCORE: 24

## 2025-01-29 ASSESSMENT — ACTIVITIES OF DAILY LIVING (ADL)
BATHING: INDEPENDENT
FEEDING YOURSELF: INDEPENDENT
TOILETING: INDEPENDENT
PATIENT'S MEMORY ADEQUATE TO SAFELY COMPLETE DAILY ACTIVITIES?: YES
HEARING - LEFT EAR: FUNCTIONAL
ADEQUATE_TO_COMPLETE_ADL: YES
GROOMING: INDEPENDENT
LACK_OF_TRANSPORTATION: NO
LACK_OF_TRANSPORTATION: NO
DRESSING YOURSELF: INDEPENDENT
HEARING - RIGHT EAR: FUNCTIONAL
JUDGMENT_ADEQUATE_SAFELY_COMPLETE_DAILY_ACTIVITIES: YES
WALKS IN HOME: INDEPENDENT
LACK_OF_TRANSPORTATION: NO

## 2025-01-29 ASSESSMENT — LIFESTYLE VARIABLES
HOW OFTEN DO YOU HAVE 6 OR MORE DRINKS ON ONE OCCASION: NEVER
SKIP TO QUESTIONS 9-10: 1
EVER FELT BAD OR GUILTY ABOUT YOUR DRINKING: NO
AUDIT-C TOTAL SCORE: 1
HOW OFTEN DO YOU HAVE A DRINK CONTAINING ALCOHOL: MONTHLY OR LESS
EVER HAD A DRINK FIRST THING IN THE MORNING TO STEADY YOUR NERVES TO GET RID OF A HANGOVER: NO
HOW MANY STANDARD DRINKS CONTAINING ALCOHOL DO YOU HAVE ON A TYPICAL DAY: 1 OR 2
TOTAL SCORE: 0
HAVE YOU EVER FELT YOU SHOULD CUT DOWN ON YOUR DRINKING: NO
AUDIT-C TOTAL SCORE: 1
HAVE PEOPLE ANNOYED YOU BY CRITICIZING YOUR DRINKING: NO

## 2025-01-29 ASSESSMENT — PATIENT HEALTH QUESTIONNAIRE - PHQ9
1. LITTLE INTEREST OR PLEASURE IN DOING THINGS: NOT AT ALL
SUM OF ALL RESPONSES TO PHQ9 QUESTIONS 1 & 2: 0
2. FEELING DOWN, DEPRESSED OR HOPELESS: NOT AT ALL

## 2025-01-29 ASSESSMENT — PAIN SCALES - GENERAL
PAINLEVEL_OUTOF10: 0 - NO PAIN

## 2025-01-29 ASSESSMENT — ENCOUNTER SYMPTOMS
JOINT SWELLING: 1
LIGHT-HEADEDNESS: 1

## 2025-01-29 ASSESSMENT — PAIN - FUNCTIONAL ASSESSMENT
PAIN_FUNCTIONAL_ASSESSMENT: 0-10

## 2025-01-29 ASSESSMENT — PAIN DESCRIPTION - PROGRESSION: CLINICAL_PROGRESSION: NOT CHANGED

## 2025-01-29 ASSESSMENT — PAIN DESCRIPTION - PAIN TYPE: TYPE: ACUTE PAIN

## 2025-01-29 ASSESSMENT — SOCIAL DETERMINANTS OF HEALTH (SDOH): IN THE PAST 12 MONTHS, HAS THE ELECTRIC, GAS, OIL, OR WATER COMPANY THREATENED TO SHUT OFF SERVICE IN YOUR HOME?: NO

## 2025-01-29 ASSESSMENT — COLUMBIA-SUICIDE SEVERITY RATING SCALE - C-SSRS
6. HAVE YOU EVER DONE ANYTHING, STARTED TO DO ANYTHING, OR PREPARED TO DO ANYTHING TO END YOUR LIFE?: NO
1. IN THE PAST MONTH, HAVE YOU WISHED YOU WERE DEAD OR WISHED YOU COULD GO TO SLEEP AND NOT WAKE UP?: NO
2. HAVE YOU ACTUALLY HAD ANY THOUGHTS OF KILLING YOURSELF?: NO

## 2025-01-29 NOTE — NURSING NOTE
Discharge instructions reviewed with patient. No questions at this time. No new medications given. Patient verbalized understanding. Handout on syncope given. Telemetry removed and left at nurses station, masimo removed and left at bedside. IV removed. Discharged home in stable condition.

## 2025-01-29 NOTE — HOSPITAL COURSE
HPI: Lester Rodriges is a 49 y.o. male  with a PMH significant of HTN presents to [facility] ED with a c/o syncope. Patient stated that he woke up around 1 am to urinate and loss consciousness. Patient denied any symptoms leading up to it. He mentioned feeling mild lightheadedness. He denied dizziness, sweating, diarrhea, recent sickness. Patient wife at bedside, she stated that she found him sweating, pale, cold, confused. Patient had an episode In 2019. Back then he had prodromal symptoms preceding the lightheadedness and never lost consciousness. Patient denied any recent medication changes.     Today he denied dizziness, lightheadedness, N, V, sweating or palpitation. He admitted to feeling tired      ED course:      Vitals: 96.4, Hr 64, RR 17, /84, 99% on RA  Labs:   -CBC: wbc 5.8, hg 14.4  -CMP: Glu 115.  Imaging:   - CT head negative  -EKG: sinus bradycardia     Interventions:  1 L NS.    Hospital Course: Patient was admitted and evaluated by cardiology. TTE and nuclear stress test was negative. They recommended decreasing losartan dose to 50mg daily and to have outpatient follow up a cardiac monitor. He is hemodynamically stable for discharge on 1/29/2025.

## 2025-01-29 NOTE — PROGRESS NOTES
01/29/25 1131   Discharge Planning   Living Arrangements Spouse/significant other   Support Systems Spouse/significant other   Assistance Needed Alert and oriented x 3, Independent with ADL's, Drives, Employed, No DME   Type of Residence Private residence   Number of Stairs to Enter Residence 13   Number of Stairs Within Residence 15   Do you have animals or pets at home? Yes   Type of Animals or Pets 1 cat and 1 dog   Who is requesting discharge planning? Provider   Home or Post Acute Services None   Expected Discharge Disposition Home   Does the patient need discharge transport arranged? No   Financial Resource Strain   How hard is it for you to pay for the very basics like food, housing, medical care, and heating? Not hard   Housing Stability   In the last 12 months, was there a time when you were not able to pay the mortgage or rent on time? N   In the past 12 months, how many times have you moved where you were living? 0   At any time in the past 12 months, were you homeless or living in a shelter (including now)? N   Transportation Needs   In the past 12 months, has lack of transportation kept you from medical appointments or from getting medications? no   In the past 12 months, has lack of transportation kept you from meetings, work, or from getting things needed for daily living? No   Patient Choice   Provider Choice list and CMS website (https://medicare.gov/care-compare#search) for post-acute Quality and Resource Measure Data were provided and reviewed with: Family;Patient   Patient / Family choosing to utilize agency / facility established prior to hospitalization No   Stroke Family Assessment   Stroke Family Assessment Needed No   Intensity of Service   Intensity of Service 0-30 min

## 2025-01-29 NOTE — SIGNIFICANT EVENT
"Senior note:     Previous episodes of osvaldo, dizziness, near syncope in 2019   TTE 2019\" EF 55-60%, without LV wall abn or diastolic dysfunction, LA wnl   Dx with suspected Suspected iatrogenic CHB with junctional escape rhythm    No current sxs. No prodromal sxs. No exertional sxs. Was urinating during event     Hds, orthostas neg   Cbc, cmp, bnp, ddimer wnl  Trops 42>95  EKG with sinus osvaldo, LAD, no significant ST seg or Twave changes   CTH without acute path    #syncope, suspected cardiogenic due to transient high grade AV block or other symptomatic bradyarrhythmia vs neurocardiogenic (less likely without prodrome but was urinating during event)    #elevated trops, likely from above; no current sxs and EKG without acute dyanmic ischemic changes   #hx of HTN, DM   -will monitor on tele and trend troponin   -orthostats neg, will continue daily   -will order updated TTE   -will need cardio/EP eval;, consult in am   -continue losartan     DVT ppx SCDs  "

## 2025-01-29 NOTE — CARE PLAN
Problem: Fall/Injury  Goal: Not fall by end of shift  Outcome: Met  Goal: Be free from injury by end of the shift  Outcome: Met      The clinical goals for the shift include patient will be free from syncopal episode this shift      Patient doing well and got testing done today. Patient being discharged

## 2025-01-29 NOTE — DISCHARGE INSTRUCTIONS
1) Please, take your home medications as instructed after being discharged from the hospital.    Follow up with cardiology for your out patient cardiac monitor. You have an appointment scheduled on 2/12/2025 to have your cardiac monitor placed.     NEW MEDICATIONS:  No new medications    NEW MEDICATION CHANGES:  Losartan: Decrease dose from 100mg daily to 50mg daily.     2) Please, follow-up with your primary care provider within 7 to 14 days after leaving the hospital. /appointment services has been requested to make an appointment for you, however if you do not hear back from them within 1 to 2 days, please call your primary physician's office to schedule an appointment. Bring your photo ID and insurance card to your appointment.   [facility]  services can be reached at [phone].    - Please, call   or appointment services and schedule a follow-up with your PCP within 1-2 weeks after you leave the hospital.    3) If you experience any worsening symptoms or have any concerns, please contact your primary care provider to schedule an appointment. If you cannot get in touch with your primary care physician, please return to the nearest emergency room or urgent care clinic for an evaluation and treatment.    Thank you for choosing [facility] and allowing us to partake in your medical care!    - Your [facility] inpatient primary care team.

## 2025-01-29 NOTE — PROGRESS NOTES
"Subjective   Subjective:   History Of Present Illness:  Lester Rodriges is a 49 y.o. male was seen and evaluated at bedside today. Overnight, no reported acute events. Patient is at no acute distress. Endorses some fatigue and weakness. Denies CP, SOB, palpitations, dizziness, fever, chills, n/v, diarrhea.     Review Of Systems:  11-point ROS was performed and is negative except as noted below and in the HPI.     Objective   Objective:     BP (!) 154/105 (BP Location: Left arm, Patient Position: Standing)   Pulse 94   Temp 37.2 °C (99 °F) (Temporal)   Resp 16   Ht 1.854 m (6' 1\")   Wt 106 kg (233 lb 8 oz)   SpO2 97%   BMI 30.81 kg/m²     Physical Exam  Constitutional:       General: He is not in acute distress.  HENT:      Head: Normocephalic and atraumatic.   Eyes:      Pupils: Pupils are equal, round, and reactive to light.   Cardiovascular:      Rate and Rhythm: Normal rate.      Heart sounds: Normal heart sounds. No murmur heard.  Pulmonary:      Effort: Pulmonary effort is normal. No respiratory distress.      Breath sounds: No wheezing or rhonchi.   Abdominal:      General: There is no distension.      Palpations: Abdomen is soft.      Tenderness: There is no abdominal tenderness. There is no guarding.   Musculoskeletal:         General: Tenderness present.      Right lower leg: No edema.      Left lower leg: No edema.      Comments: Soft brace on right knee from MCL injury, some tenderness present.   Skin:     General: Skin is warm and dry.      Findings: No erythema.   Neurological:      Mental Status: He is alert and oriented to person, place, and time.       Lab Work:     Lab Results   Component Value Date    WBC 6.3 01/29/2025    HGB 13.8 01/29/2025    HCT 40.9 (L) 01/29/2025    MCV 87 01/29/2025     01/29/2025     Lab Results   Component Value Date    GLUCOSE 136 (H) 01/29/2025    CALCIUM 8.5 (L) 01/29/2025     01/29/2025    K 4.2 01/29/2025    CO2 29 01/29/2025     01/29/2025    " BUN 15 01/29/2025    CREATININE 0.81 01/29/2025     Thyroid Stimulating Hormone   Date Value Ref Range Status   01/29/2025 2.42 0.44 - 3.98 mIU/L Final     Cultures:   No results found for the last 90 days.    Images:     CT head wo IV contrast   Final Result   No acute intracranial abnormality.             MACRO:   None        Signed by: Brenda Harper 1/29/2025 3:32 AM   Dictation workstation:   [id number]         Medications:     Scheduled:  [Held by provider] losartan, 100 mg, oral, Daily  perflutren lipid microspheres, 0.5-10 mL of dilution, intravenous, Once in imaging  perflutren protein A microsphere, 0.5 mL, intravenous, Once in imaging  potassium phosphate (monobasic), 500 mg, oral, 4x daily  sulfur hexafluoride microsphr, 2 mL, intravenous, Once in imaging    Continuous:     PRN:  PRN medications: dextrose, dextrose, glucagon, glucagon     Assessment & Plan:     Lester Rodriges is a 49 y.o. male  with a PMH significant of HTN and lyme disease (2006) admitted to [facility] ED with syncope episode.     Acute Medical Issues   #Syncope  Elevated troponin 95 -> 113 -> 48  Last echo 5 years ago showing EF 55-60%  EKG showing sinus bradycardia  No prodromal symptoms  TSH: 2.42  PLAN:  - daily orthostatics  - Echo   - Tele  - Cardiology recs: TTE, exercise NM stress test, RLE US, okay to hold losartan.     Chronic Medical Issues   HTN-> holding losartan 100 mg        IVF: PRN   Electrolytes: Replete as needed   Diet: NPO  GI ppx: none  DVT ppx: none   Antibiotics: none   Lines: iv  Code: Full code     Code Status: Full code  Emergency Contact: Extended Emergency Contact Information  Primary Emergency Contact: Ita Rodriges  Home Phone: [phone]  Relation: Spouse      Disposition: 49 y.o.male admitted for syncope. Estimated length of stay less than 48 hrs.

## 2025-01-29 NOTE — ED PROVIDER NOTES
History/Exam limitations: none  HPI was provided by patient    HPI:    Chief Complaint   Patient presents with    Syncope     Got up to use restroom and passed out        Lester Rodriges is a 49 y.o. male presents with chief complaint of syncope.  States he was on the toilet urinating did have alcoholic beverage tonight and next thing he knew he was on the ground.  Wife quickly came and saw him.  Loss conscious was only briefly lasting a few seconds.  He is alert and oriented presently now.  No recent changes in medication.  States this happened denies any recent travel recent trauma recent immobilization history of PE or DVT, unilateral leg swelling or recent surgery and not taking any hormones.  No history of connective tissue disorders or blood disorder.  Denies any illicit drug use.  States this has happened to him in the past before from his blood pressure medication.  Wife did say when she arrived he was pale but has since had his color back to normal.  Did not sustain any injuries.  Syncope       ROS:All other review of systems are negative except as noted above and HPI or ROS. (Bolded if positive)  CONSTITUTIONAL fever, chills.  EYES:      blurry vision, change in vision  ENT sore throat, congestion, rhinorrhea.  CARDIOVASCULAR palpitations, swelling, chest pain  RESPIRATORY cough, shortness of breath, wheeze  GI abdominal pain, nausea, vomiting, diarrhea.  GENITOURINARY dysuria, hematuria, frequency.  MUSCULOSKELETAL deformity, neck pain.  SKIN rash, color change.  NEUROLOGIC  headache, numbness, focal weakness.    Physical exam  General/Constitutional: Alert, oriented , cooperative,  in no acute distress.  Head: normocephalic, atraumatic  Skin: Intact,  dry skin, no lesions.  Eyes: PERRL, EOMs intact,  Conjunctiva pink with no erythema or exudates. No scleral icterus.   ENT: No external deformities. Nares patent, mucus membranes moist.  Pharynx clear, uvula midline.   Neck: Supple, without meningismus.  Trachea at midline. No lymphadenopathy. No JVD  Pulmonary: Clear bilaterally. No crackles, rhonchi or wheezing.   Cardiac: Regular rate and regular rhythm.  Pulses 2+ throughout upper and lower extremities.  No murmur, gallop, rub.   Abdomen: Soft, nontender, active bowel sounds.  No palpable organomegaly.  No rebound or guarding.  No CVA tenderness.  No pulsatile mass  Genitourinary: Exam deferred.  Musculoskeletal: Full range of motion, no deformity. Pulses full and equal. Non-edematous.  Neurological: Alert and oriented to person place and time.  no focal neuro deficits.  Sensation intact throughout.  Neurovascular intact in bilateral upper and lower extremities  Psychiatric: Appropriate mood and affect. Calm.       History reviewed. No pertinent past medical history.   Social History     Socioeconomic History    Marital status:    Tobacco Use    Smoking status: Former    Smokeless tobacco: Current     Types: Chew   Substance and Sexual Activity    Alcohol use: Never    Drug use: Never     Current Outpatient Medications   Medication Instructions    losartan (COZAAR) 100 mg, oral, Daily     No Known Allergies        ED Triage Vitals   Temp Pulse Resp BP   -- -- -- --      SpO2 Temp src Heart Rate Source Patient Position   -- -- -- --      BP Location FiO2 (%)     -- --                 Labs and Imaging  CT head wo IV contrast   Final Result   No acute intracranial abnormality.             MACRO:   None        Signed by: Brenda Harper 1/29/2025 3:32 AM   Dictation workstation:   [id number]        Labs Reviewed   BASIC METABOLIC PANEL - Abnormal       Result Value    Glucose 115 (*)     Sodium 139      Potassium 3.5      Chloride 101      Bicarbonate 31      Anion Gap 11      Urea Nitrogen 16      Creatinine 0.88      eGFR >90      Calcium 8.9     SERIAL TROPONIN-INITIAL - Abnormal    Troponin I, High Sensitivity 42 (*)     Narrative:     Less than 99th percentile of normal range cutoff-  Female and children  under 18 years old <14 ng/L; Male <21 ng/L: Negative  Repeat testing should be performed if clinically indicated.     Female and children under 18 years old 14-50 ng/L; Male 21-50 ng/L:  Consistent with possible cardiac damage and possible increased clinical   risk. Serial measurements may help to assess extent of myocardial damage.     >50 ng/L: Consistent with cardiac damage, increased clinical risk and  myocardial infarction. Serial measurements may help assess extent of   myocardial damage.      NOTE: Children less than 1 year old may have higher baseline troponin   levels and results should be interpreted in conjunction with the overall   clinical context.     NOTE: Troponin I testing is performed using a different   testing methodology at [facility] than at other   [facility]. Direct result comparisons should only   be made within the same method.   TROPONIN I, HIGH SENSITIVITY - Abnormal    Troponin I, High Sensitivity 95 (*)     Narrative:     Less than 99th percentile of normal range cutoff-  Female and children under 18 years old <14 ng/L; Male <21 ng/L: Negative  Repeat testing should be performed if clinically indicated.     Female and children under 18 years old 14-50 ng/L; Male 21-50 ng/L:  Consistent with possible cardiac damage and possible increased clinical   risk. Serial measurements may help to assess extent of myocardial damage.     >50 ng/L: Consistent with cardiac damage, increased clinical risk and  myocardial infarction. Serial measurements may help assess extent of   myocardial damage.      NOTE: Children less than 1 year old may have higher baseline troponin   levels and results should be interpreted in conjunction with the overall   clinical context.     NOTE: Troponin I testing is performed using a different   testing methodology at [facility] than at other   [facility]. Direct result comparisons should only   be made within the same method.    B-TYPE NATRIURETIC PEPTIDE - Normal    BNP 9      Narrative:        <100 pg/mL - Heart failure unlikely  100-299 pg/mL - Intermediate probability of acute heart                  failure exacerbation. Correlate with clinical                  context and patient history.    >=300 pg/mL - Heart Failure likely. Correlate with clinical                  context and patient history.    BNP testing is performed using different testing methodology at [facility] than at other [facility]. Direct result comparisons should only be made within the same method.      D-DIMER, VTE EXCLUSION - Normal    D-Dimer, Quantitative VTE Exclusion <215      Narrative:     The VTE Exclusion D-Dimer assay is reported in ng/mL Fibrinogen Equivalent Units (FEU).    Per 's instructions for use, a value of less than 500 ng/mL (FEU) may help to exclude DVT or PE in outpatients when the assay is used with a clinical pretest probability assessment.([facility] must utilize and document eCalc 'Wells Score Deep Vein Thrombosis Risk' for DVT exclusion only. Emergency Department should utilize  Guidelines for Emergency Department Use of the VTE Exclusion D-Dimer and Clinical Pretest probability assessment model for DVT or PE exclusion.)   CBC WITH AUTO DIFFERENTIAL    WBC 5.8      nRBC 0.0      RBC 4.89      Hemoglobin 14.4      Hematocrit 42.3      MCV 87      MCH 29.4      MCHC 34.0      RDW 13.5      Platelets 231      Neutrophils % 57.7      Immature Granulocytes %, Automated 0.2      Lymphocytes % 31.3      Monocytes % 7.7      Eosinophils % 2.2      Basophils % 0.9      Neutrophils Absolute 3.37      Immature Granulocytes Absolute, Automated 0.01      Lymphocytes Absolute 1.83      Monocytes Absolute 0.45      Eosinophils Absolute 0.13      Basophils Absolute 0.05     TROPONIN SERIES- (INITIAL, 1 HR)    Narrative:     The following orders were created for panel order Troponin I Series, High Sensitivity (0, 1  HR).  Procedure                               Abnormality         Status                     ---------                               -----------         ------                     Troponin I, High Sensiti...[[id number]]  Abnormal            Final result               Troponin, High Sensitivi...[[id number]]                                                   Please view results for these tests on the individual orders.               Procedure  Procedures       Medical Decision Making:     External Records Reviewed: I reviewed recent and relevant outside records    ED Course as of 01/29/25 0428 Wed Jan 29, 2025   0400 Troponin I, High Sensitivity(!): 42 [WL]   0400 Troponin I, High Sensitivity(!!): 95 [WL]   0401 Ct head shows No acute intracranial abnormality.       [WL]   0426 Given elevated troponin concern for any arrhythmia he went and that caused him to syncopized.  Spoke with Dr Florence manzo for hospitalist service who agrees on admission.  Did give him a liter of fluid here for the orthostasis. [WL]      ED Course User Index  [WL] Pranav Negrete,          Diagnoses as of 01/29/25 0428   Orthostatic syncope   Elevated troponin         CT head wo IV contrast   Final Result   No acute intracranial abnormality.             MACRO:   None        Signed by: Brenda Harper 1/29/2025 3:32 AM   Dictation workstation:   [id number]        Labs Reviewed   BASIC METABOLIC PANEL - Abnormal       Result Value    Glucose 115 (*)     Sodium 139      Potassium 3.5      Chloride 101      Bicarbonate 31      Anion Gap 11      Urea Nitrogen 16      Creatinine 0.88      eGFR >90      Calcium 8.9     SERIAL TROPONIN-INITIAL - Abnormal    Troponin I, High Sensitivity 42 (*)     Narrative:     Less than 99th percentile of normal range cutoff-  Female and children under 18 years old <14 ng/L; Male <21 ng/L: Negative  Repeat testing should be performed if clinically indicated.     Female and children under 18 years old 14-50 ng/L;  Male 21-50 ng/L:  Consistent with possible cardiac damage and possible increased clinical   risk. Serial measurements may help to assess extent of myocardial damage.     >50 ng/L: Consistent with cardiac damage, increased clinical risk and  myocardial infarction. Serial measurements may help assess extent of   myocardial damage.      NOTE: Children less than 1 year old may have higher baseline troponin   levels and results should be interpreted in conjunction with the overall   clinical context.     NOTE: Troponin I testing is performed using a different   testing methodology at [facility] than at other   [facility] hospitals. Direct result comparisons should only   be made within the same method.   TROPONIN I, HIGH SENSITIVITY - Abnormal    Troponin I, High Sensitivity 95 (*)     Narrative:     Less than 99th percentile of normal range cutoff-  Female and children under 18 years old <14 ng/L; Male <21 ng/L: Negative  Repeat testing should be performed if clinically indicated.     Female and children under 18 years old 14-50 ng/L; Male 21-50 ng/L:  Consistent with possible cardiac damage and possible increased clinical   risk. Serial measurements may help to assess extent of myocardial damage.     >50 ng/L: Consistent with cardiac damage, increased clinical risk and  myocardial infarction. Serial measurements may help assess extent of   myocardial damage.      NOTE: Children less than 1 year old may have higher baseline troponin   levels and results should be interpreted in conjunction with the overall   clinical context.     NOTE: Troponin I testing is performed using a different   testing methodology at [facility] than at other   [facility]. Direct result comparisons should only   be made within the same method.   B-TYPE NATRIURETIC PEPTIDE - Normal    BNP 9      Narrative:        <100 pg/mL - Heart failure unlikely  100-299 pg/mL - Intermediate probability of acute heart                   failure exacerbation. Correlate with clinical                  context and patient history.    >=300 pg/mL - Heart Failure likely. Correlate with clinical                  context and patient history.    BNP testing is performed using different testing methodology at [facility] than at other [facility] hospitals. Direct result comparisons should only be made within the same method.      D-DIMER, VTE EXCLUSION - Normal    D-Dimer, Quantitative VTE Exclusion <215      Narrative:     The VTE Exclusion D-Dimer assay is reported in ng/mL Fibrinogen Equivalent Units (FEU).    Per 's instructions for use, a value of less than 500 ng/mL (FEU) may help to exclude DVT or PE in outpatients when the assay is used with a clinical pretest probability assessment.([facility] must utilize and document eCalc 'Wells Score Deep Vein Thrombosis Risk' for DVT exclusion only. Emergency Department should utilize  Guidelines for Emergency Department Use of the VTE Exclusion D-Dimer and Clinical Pretest probability assessment model for DVT or PE exclusion.)   CBC WITH AUTO DIFFERENTIAL    WBC 5.8      nRBC 0.0      RBC 4.89      Hemoglobin 14.4      Hematocrit 42.3      MCV 87      MCH 29.4      MCHC 34.0      RDW 13.5      Platelets 231      Neutrophils % 57.7      Immature Granulocytes %, Automated 0.2      Lymphocytes % 31.3      Monocytes % 7.7      Eosinophils % 2.2      Basophils % 0.9      Neutrophils Absolute 3.37      Immature Granulocytes Absolute, Automated 0.01      Lymphocytes Absolute 1.83      Monocytes Absolute 0.45      Eosinophils Absolute 0.13      Basophils Absolute 0.05     TROPONIN SERIES- (INITIAL, 1 HR)    Narrative:     The following orders were created for panel order Troponin I Series, High Sensitivity (0, 1 HR).  Procedure                               Abnormality         Status                     ---------                               -----------         ------                     Troponin  I, High Sensiti...[[id number]]  Abnormal            Final result               Troponin, High Sensitivi...[[id number]]                                                   Please view results for these tests on the individual orders.       The patient presented for evaluation of syncope.  Differential included but not limited to ACS, arrhythmia,  electrolyte abnormality, PE, orthostasis, dehydration.  Found to be orthostatic here.  Was given normal saline  Imaging studies if performed were independently reviewed and interpreted by myself and confirmed by radiologist.  Lab work and imaging were performed.   Patient requires continued workup and management of their symptoms and will be admitted to the hospital for further evaluation and treatment.  Plan be to admit to medicine.     I discussed the differential, results and plan with the patient and/or family/friend/caregiver if present.    Note: This note was dictated by speech recognition. Minor errors in transcription may be present.           Pranav Negrete DO  01/29/25 0428

## 2025-01-29 NOTE — CONSULTS
Inpatient consult to Cardiology  Consult performed by: Marty Elias, CRYSTAL-CNP  Consult ordered by: Flaco Chavez MD        History Of Present Illness:    Lester Rodriges is a 49 y.o. male with PMH of HTN presenting with syncope. Patient stated that he woke up around 1 am to urinate and loss consciousness. Patient denied any symptoms leading up to it. He mentioned feeling mild lightheadedness. He denied CP, dyspnea, palpitations, loss of bowel or bladder control. Labs show troponin 42, 95, 113, 48. CT head negative. EKG with SB HR 59 bpm, LAD.     Patient had an episode In 2019. Back then he had prodromal symptoms preceding the lightheadedness and never lost consciousness. TTE at that time was normal. 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     Review of Systems   Musculoskeletal:  Positive for joint swelling (right knee MCL injury).   Neurological:  Positive for syncope and light-headedness.   All other systems reviewed and are negative.      Last Recorded Vitals:  Vitals:    01/29/25 0751 01/29/25 1133 01/29/25 1134 01/29/25 1136   BP: 135/86 142/88 (!) 147/100 (!) 154/105   BP Location: Right arm Left arm Left arm Left arm   Patient Position: Lying Lying Sitting Standing   Pulse: 73 73 78 94   Resp: 18      Temp: 37.2 °C (99 °F)      TempSrc: Temporal      SpO2: 97% 96% 96% 97%   Weight:       Height:           Last Labs:  CBC - 1/29/2025:  6:00 AM  6.3 13.8 210    40.9      CMP - 1/29/2025:  6:00 AM  8.5 6.3 18 --- 0.4   1.6 3.9 31 93      PTT - No results in last year.  _   _ _     Troponin I, High Sensitivity   Date/Time Value Ref Range Status   01/29/2025 11:07 AM 48 (H) 0 - 20 ng/L Final   01/29/2025 06:00  (HH) 0 - 20 ng/L Final     Comment:     Previous result verified on 1/29/2025 0336 on specimen/case [id number] called with component [facility] for procedure Troponin I, High Sensitivity with value 95 ng/L.   01/29/2025 03:06 AM 95 () 0 - 20 ng/L Final     BNP   Date/Time  "Value Ref Range Status   01/29/2025 01:57 AM 9 0 - 99 pg/mL Final     LDL Calculated   Date/Time Value Ref Range Status   10/24/2024 04:51 PM 84 <=99 mg/dL Final     Comment:                                 Near   Borderline      AGE      Desirable  Optimal    High     High     Very High     0-19 Y     0 - 109     ---    110-129   >/= 130     ----    20-24 Y     0 - 119     ---    120-159   >/= 160     ----      >24 Y     0 -  99   100-129  130-159   160-189     >/=190       VLDL   Date/Time Value Ref Range Status   10/24/2024 04:51 PM 40 0 - 40 mg/dL Final      Last I/O:  No intake/output data recorded.    Past Cardiology Tests (Last 3 Years):  EKG:  ECG 12 Lead 01/29/2025 (Preliminary)  [facility] [place]    Echo:  (6/4/2019)  CONCLUSIONS:   1. The left ventricular systolic function is normal with a 55-60% estimated ejection fraction.   2. There is trace mitral and tricuspid regurgitation.    Ejection Fractions:  No results found for: \"EF\"  Cath:  No results found for this or any previous visit from the past 1095 days.    Stress Test:  No results found for this or any previous visit from the past 1095 days.    Cardiac Imaging:  No results found for this or any previous visit from the past 1095 days.      Past Medical History:  He has no past medical history on file.    Past Surgical History:  He has a past surgical history that includes Vasectomy (06/09/2015) and Hernia repair (06/09/2015).      Social History:  He reports that he has quit smoking. His smokeless tobacco use includes chew. He reports that he does not drink alcohol and does not use drugs.    Family History:  No family history on file.     Allergies:  Patient has no known allergies.    Inpatient Medications:  Scheduled medications   Medication Dose Route Frequency    [Held by provider] losartan  100 mg oral Daily    perflutren lipid microspheres  0.5-10 mL of dilution intravenous Once in imaging    perflutren protein A microsphere  0.5 mL intravenous Once in " imaging    potassium phosphate (monobasic)  500 mg oral 4x daily    sulfur hexafluoride microsphr  2 mL intravenous Once in imaging     PRN medications   Medication    dextrose    dextrose    glucagon    glucagon     Continuous Medications   Medication Dose Last Rate     Outpatient Medications:  Current Outpatient Medications   Medication Instructions    losartan (COZAAR) 100 mg, oral, Daily     Physical Exam  Vitals reviewed.   Constitutional:       Appearance: Normal appearance. He is normal weight.   HENT:      Head: Normocephalic and atraumatic.   Neck:      Vascular: No carotid bruit or JVD.   Cardiovascular:      Rate and Rhythm: Normal rate and regular rhythm.      Pulses: Normal pulses.      Heart sounds: Normal heart sounds.   Pulmonary:      Effort: Pulmonary effort is normal.      Breath sounds: Normal breath sounds.   Chest:      Chest wall: No tenderness.   Abdominal:      General: Abdomen is flat. Bowel sounds are normal.      Palpations: Abdomen is soft.   Musculoskeletal:      Right knee: Decreased range of motion. Tenderness present.   Skin:     General: Skin is warm and dry.   Neurological:      General: No focal deficit present.      Mental Status: He is alert and oriented to person, place, and time. Mental status is at baseline.   Psychiatric:         Mood and Affect: Mood normal.         Behavior: Behavior normal.            Assessment/Plan   Assessment  49 y.o. male with PMH of HTN presenting with syncope. Patient stated that he woke up around 1 am to urinate and loss consciousness. Patient denied any symptoms leading up to it. He mentioned feeling mild lightheadedness. He denied CP, dyspnea, palpitations, loss of bowel or bladder control. Labs show troponin 42, 95, 113, 48. CT head negative. EKG with SB HR 59 bpm, LAD. BP 90s/70s over night.     Patient had an episode In 2019. Back then he had prodromal symptoms preceding the lightheadedness and never lost consciousness. TTE at that time was  normal. 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     Syncope  Elevated troponin - unclear etiology  Essential hypertension  Sinus bradycardia  RLE pain - likely secondary to MCL injury    Plan  -TTE  -Exercise NM stress test  -RLE US to rule out DVT  -Orthostatic VS negative  -OK to hold losartan for now         Code Status:  Full Code    I spent minutes in the professional and overall care of this patient.        Marty Elias, APRN-CNP

## 2025-01-29 NOTE — DISCHARGE SUMMARY
Discharge Diagnosis  Syncope, unspecified syncope type    Issues Requiring Follow-Up  Syncope, unspecified syncope type    Discharge Meds     Medication List      CHANGE how you take these medications     losartan 100 mg tablet; Commonly known as: Cozaar; Take 0.5 tablets (50   mg) by mouth once daily.; What changed: how much to take       Test Results Pending At Discharge  Pending Labs       No current pending labs.            Hospital Course  HPI: Lester Rodriges is a 49 y.o. male  with a PMH significant of HTN presents to [facility] ED with a c/o syncope. Patient stated that he woke up around 1 am to urinate and loss consciousness. Patient denied any symptoms leading up to it. He mentioned feeling mild lightheadedness. He denied dizziness, sweating, diarrhea, recent sickness. Patient wife at bedside, she stated that she found him sweating, pale, cold, confused. Patient had an episode In 2019. Back then he had prodromal symptoms preceding the lightheadedness and never lost consciousness. Patient denied any recent medication changes.     Today he denied dizziness, lightheadedness, N, V, sweating or palpitation. He admitted to feeling tired      ED course:      Vitals: 96.4, Hr 64, RR 17, /84, 99% on RA  Labs:   -CBC: wbc 5.8, hg 14.4  -CMP: Glu 115.  Imaging:   - CT head negative  -EKG: sinus bradycardia     Interventions:  1 L NS.    Hospital Course: Patient was admitted and evaluated by cardiology. TTE and nuclear stress test was negative. They recommended decreasing losartan dose to 50mg daily and to have outpatient follow up a cardiac monitor. He is hemodynamically stable for discharge on 1/29/2025.     Pertinent Physical Exam At Time of Discharge  Physical Exam  Constitutional:       General: He is not in acute distress.  HENT:      Head: Normocephalic and atraumatic.   Eyes:      Pupils: Pupils are equal, round, and reactive to light.   Cardiovascular:      Rate and Rhythm: Normal rate.       Heart sounds: Normal heart sounds. No murmur heard.  Pulmonary:      Effort: Pulmonary effort is normal. No respiratory distress.      Breath sounds: No wheezing or rhonchi.   Abdominal:      General: There is no distension.      Palpations: Abdomen is soft.      Tenderness: There is no abdominal tenderness. There is no guarding.   Musculoskeletal:         General: Tenderness present.      Right lower leg: No edema.      Left lower leg: No edema.      Comments: Soft brace on right knee from MCL injury, some tenderness present.   Skin:     General: Skin is warm and dry.      Findings: No erythema.   Neurological:      Mental Status: He is alert and oriented to person, place, and time.         Outpatient Follow-Up  Future Appointments   Date Time Provider Department Center   2/12/2025 10:00 AM [facility]   3/18/2025 10:00 AM Marty Elias, APRN-CNP [id number] Mick Curran MD  Transitional Year, PGY-1

## 2025-01-29 NOTE — H&P
Chief Complaint     Chief Complaint   Patient presents with    Syncope     Got up to use restroom and passed out      HPI    HPI: Lester Rodriges is a 49 y.o. male  with a PMH significant of HTN presents to [facility] ED with a c/o syncope. Patient stated that he woke up around 1 am to urinate and loss consciousness. Patient denied any symptoms leading up to it. He mentioned feeling mild lightheadedness. He denied dizziness, sweating, diarrhea, recent sickness. Patient wife at bedside, she stated that she found him sweating, pale, cold, confused. Patient had an episode In 2019. Back then he had prodromal symptoms preceding the lightheadedness and never lost consciousness. Patient denied any recent medication changes.    Today he denied dizziness, lightheadedness, N, V, sweating or palpitation. He admitted to feeling tired     ED course:     Vitals: 96.4, Hr 64, RR 17, /84, 99% on RA  Labs:   -CBC: wbc 5.8, hg 14.4  -CMP: Glu 115.  Imaging:   - CT head negative  -EKG: sinus bradycardia    Interventions:  1 L NS.    ROS: 12 points review of system is negative except as stated in the HPI above.   ROS  Review of Systems     Past Medical History   History reviewed. No pertinent past medical history.   Surgical History     Past Surgical History:   Procedure Laterality Date    HERNIA REPAIR  06/09/2015    Hernia Repair    VASECTOMY  06/09/2015    Surgery Vas Deferens Vasectomy     Family History   No family history on file.  Social History     Social History     Socioeconomic History    Marital status:      Spouse name: Not on file    Number of children: Not on file    Years of education: Not on file    Highest education level: Not on file   Occupational History    Not on file   Tobacco Use    Smoking status: Former    Smokeless tobacco: Current     Types: Chew   Substance and Sexual Activity    Alcohol use: Never    Drug use: Never    Sexual activity: Not on file   Other Topics Concern    Not on  "file   Social History Narrative    Not on file     Social Drivers of Health     Financial Resource Strain: Not on file   Food Insecurity: Not on file   Transportation Needs: Not on file   Physical Activity: Not on file   Stress: Not on file   Social Connections: Not on file   Intimate Partner Violence: Not on file   Housing Stability: Not on file     Tobacco Use: High Risk (1/29/2025)    Patient History     Smoking Tobacco Use: Former     Smokeless Tobacco Use: Current     Passive Exposure: Not on file      Social History     Substance and Sexual Activity   Alcohol Use Never      Allergies   No Known Allergies   Meds    Scheduled medications    Continuous medications    PRN medications       Objective     Vitals  Visit Vitals  /84 (Patient Position: Standing)   Pulse 64   Temp 35.8 °C (96.4 °F) (Temporal)   Resp 17   Ht 1.854 m (6' 1\")   Wt 104 kg (230 lb)   SpO2 99%   BMI 30.34 kg/m²   Smoking Status Former   BSA 2.31 m²        Physical Examination:  GEN:  A&Ox3, no acute distress, appears comfortable.  Conversational and appropriate.  No confusion or gross mental status changes.  EYES: EOMI, non-injected sclera.  ENT: Moist mucous membranes, no apparent injuries or lesions.   CARDIO: Normal rate and regular rhythm. Sinus bradycardia. No murmurs, rubs, or gallops.  2+ equal pulses of the distal extremities.   PULM: Clear to auscultation bilaterally. No rales, rhonchi, or wheezes. Good symmetric chest expansion.  GI: BS+  Soft, non-distended. No rebound tenderness or guarding.  SKIN: Warm and dry, no rashes or lesions.  MSK: ROM intact the extremities without contractures.   EXT: No peripheral edema, contusions, or wounds.   PSYCH: Appropriate mood and behavior, converses and responds appropriately during exam.    I/Os  No intake or output data in the 24 hours ending 01/29/25 0432  Vent Settings       Labs:   Results from last 72 hours   Lab Units 01/29/25  0157   SODIUM mmol/L 139   POTASSIUM mmol/L 3.5 " "  CHLORIDE mmol/L 101   CO2 mmol/L 31   BUN mg/dL 16   CREATININE mg/dL 0.88   GLUCOSE mg/dL 115*   CALCIUM mg/dL 8.9   ANION GAP mmol/L 11   EGFR mL/min/1.73m*2 >90      Results from last 72 hours   Lab Units 01/29/25  0157   WBC AUTO x10*3/uL 5.8   HEMOGLOBIN g/dL 14.4   HEMATOCRIT % 42.3   PLATELETS AUTO x10*3/uL 231   NEUTROS PCT AUTO % 57.7   LYMPHS PCT AUTO % 31.3   MONOS PCT AUTO % 7.7   EOS PCT AUTO % 2.2      Lab Results   Component Value Date    CALCIUM 8.9 01/29/2025      No results found for: \"CRP\"   [unfilled]     Micro/ID:   No results found for the last 90 days.                   No lab exists for component: \"AGALPCRNB\"   .ID  No results found for: \"URINECULTURE\", \"BLOODCULT\", \"CSFCULTSMEAR\"      Images    CT head wo IV contrast  Narrative: Interpreted By:  Brenda Harper,   STUDY:  CT HEAD WO IV CONTRAST;  1/29/2025 3:09 am      INDICATION:  Signs/Symptoms:syncope.      COMPARISON:  CT scan of the head 11/26/2019      ACCESSION NUMBER(S):  [id number]      ORDERING CLINICIAN:  LALA ABAD      TECHNIQUE:  Axial noncontrast CT images of the head.      FINDINGS:  BRAIN PARENCHYMA: Gray-white matter interfaces are preserved. No  mass, mass effect or midline shift.      HEMORRHAGE: No acute intracranial hemorrhage.  VENTRICLES and EXTRA-AXIAL SPACES: Normal size.  EXTRACRANIAL SOFT TISSUES: Soft tissue scarring overlying the cranial  vertex. PARANASAL SINUSES/MASTOIDS: Mild opacification of several  ethmoid air cells. Remainder of the visualized paranasal sinuses and  mastoid air cells are aerated. CALVARIUM: No depressed skull  fracture. No destructive osseous lesion.      OTHER FINDINGS: None.      Impression: No acute intracranial abnormality.          MACRO:  None      Signed by: Brenda Harper 1/29/2025 3:32 AM  Dictation workstation:   [id number]    Assessment and Plan    Problem list:   Assessment & Plan  Syncope, unspecified syncope type      Lester Rodriges is a 49 y.o. male admitted " following syncopal episode.    Acute Medical Issues   #Syncope  Elevated troponin 95  Last echo 5 years ago showing EF 55-60%  EKG showing sinus bradycardia  No prodromal symptoms  PLAN:  - trending troponin  - daily orthostatics  - repeat Echo   - Tele  - consult cardio in AM    Chronic Medical Issues   HTN-> continue losartan 100 mg      IVF: PRN   Electrolytes: Replete as needed   Diet: NPO  GI ppx: none  DVT ppx: none   Antibiotics: none   Lines: iv  Code: No Order     Code Status: No Order   Emergency Contact: Extended Emergency Contact Information  Primary Emergency Contact: Ita Rodriges  Home Phone: [phone]  Relation: Spouse     Disposition: 49 y.o.male admitted for syncope. Estimated length of stay less than 48 hrs.     Brenda Al MD  1/29/2025  Internal Medicine PGY-1

## 2025-01-30 ENCOUNTER — PATIENT OUTREACH (OUTPATIENT)
Dept: PRIMARY CARE | Facility: CLINIC | Age: 50
End: 2025-01-30
Payer: COMMERCIAL

## 2025-01-30 NOTE — PROGRESS NOTES
Outreach made for post discharge follow up. At time of call, the patient stated his condition has returned to baseline. No further episodes of syncope. Patient verbalized understanding of decreasing losartan to 1/2 pill, 50 mg, daily. Patient also confirmed Holter monitor appt for 2/12/2025 at [facility]. Patient declined to schedule a hospital follow up with primary care. He will schedule an appt after his cardiology follow up. Advised patient to call his primary care office with any needs in the interim.     Discharge Facility: [facility]  Discharge Diagnosis: Syncope, unspecified syncope type   Admission Date: 1/29/2025  Discharge Date: 1/29/2025    PCP Appointment Date: Declined to schedule  Specialist Appointment Date: 3/18/2025 10:00 AM Marty Lares, Cardiology  Hospital Encounter and Summary Linked: Yes  ED to Hosp-Admission (Discharged) with Flaco Chavez MD; Pranav Negrete DO (01/29/2025)

## 2025-02-11 LAB
ATRIAL RATE: 59 BPM
P AXIS: -9 DEGREES
P OFFSET: 191 MS
P ONSET: 148 MS
PR INTERVAL: 120 MS
Q ONSET: 208 MS
QRS COUNT: 10 BEATS
QRS DURATION: 94 MS
QT INTERVAL: 414 MS
QTC CALCULATION(BAZETT): 409 MS
QTC FREDERICIA: 411 MS
R AXIS: -43 DEGREES
T AXIS: 33 DEGREES
T OFFSET: 415 MS
VENTRICULAR RATE: 59 BPM

## 2025-02-12 ENCOUNTER — HOSPITAL ENCOUNTER (OUTPATIENT)
Dept: CARDIOLOGY | Facility: HOSPITAL | Age: 50
Discharge: HOME | End: 2025-02-12
Payer: COMMERCIAL

## 2025-02-12 DIAGNOSIS — R55 VASOVAGAL SYNCOPE: ICD-10-CM

## 2025-02-12 LAB
ATRIAL RATE: 65 BPM
P AXIS: 92 DEGREES
P OFFSET: 178 MS
P ONSET: 153 MS
PR INTERVAL: 112 MS
Q ONSET: 209 MS
QRS COUNT: 11 BEATS
QRS DURATION: 92 MS
QT INTERVAL: 408 MS
QTC CALCULATION(BAZETT): 424 MS
QTC FREDERICIA: 418 MS
R AXIS: -44 DEGREES
T AXIS: 26 DEGREES
T OFFSET: 413 MS
VENTRICULAR RATE: 65 BPM

## 2025-02-12 PROCEDURE — 93246 EXT ECG>7D<15D RECORDING: CPT

## 2025-03-18 ENCOUNTER — APPOINTMENT (OUTPATIENT)
Dept: CARDIOLOGY | Facility: HOSPITAL | Age: 50
End: 2025-03-18
Payer: COMMERCIAL

## 2025-08-06 ENCOUNTER — PATIENT OUTREACH (OUTPATIENT)
Dept: PRIMARY CARE | Facility: CLINIC | Age: 50
End: 2025-08-06
Payer: COMMERCIAL

## 2025-08-06 DIAGNOSIS — Z12.12 SCREENING FOR COLORECTAL CANCER: ICD-10-CM

## 2025-08-06 DIAGNOSIS — Z12.11 SCREENING FOR COLORECTAL CANCER: ICD-10-CM
